# Patient Record
Sex: MALE | Race: WHITE | NOT HISPANIC OR LATINO | Employment: FULL TIME | URBAN - METROPOLITAN AREA
[De-identification: names, ages, dates, MRNs, and addresses within clinical notes are randomized per-mention and may not be internally consistent; named-entity substitution may affect disease eponyms.]

---

## 2017-05-08 ENCOUNTER — TELEPHONE (OUTPATIENT)
Dept: CARDIOLOGY | Facility: MEDICAL CENTER | Age: 47
End: 2017-05-08

## 2017-05-08 NOTE — TELEPHONE ENCOUNTER
----- Message from Mali Murray L.P.N. sent at 5/8/2017 10:54 AM PDT -----  Regarding: FW: doctor to doctor   Contact: [phone]  Printed and To  To  ----- Message -----     From: Nga Julian     Sent: 5/8/2017  10:43 AM       To: Mali Murray L.P.N.  Subject: doctor to doctor                                 KIMMY/mali Gutierrez with [facility] E/R for a doctor to doctor between Dr Sexton and our ADD, TT, asap.  Dr Sexton is at

## 2017-07-18 ENCOUNTER — HOSPITAL ENCOUNTER (OUTPATIENT)
Dept: RADIOLOGY | Facility: MEDICAL CENTER | Age: 47
End: 2017-07-18
Attending: ORTHOPAEDIC SURGERY
Payer: COMMERCIAL

## 2017-07-18 DIAGNOSIS — S82.251G: ICD-10-CM

## 2017-07-18 DIAGNOSIS — S82.451G: ICD-10-CM

## 2017-07-18 PROCEDURE — 73700 CT LOWER EXTREMITY W/O DYE: CPT | Mod: RT

## 2017-08-14 ENCOUNTER — APPOINTMENT (OUTPATIENT)
Dept: ADMISSIONS | Facility: MEDICAL CENTER | Age: 47
End: 2017-08-14
Attending: ORTHOPAEDIC SURGERY
Payer: COMMERCIAL

## 2017-08-14 RX ORDER — IBUPROFEN 200 MG
400 TABLET ORAL EVERY EVENING
COMMUNITY

## 2017-08-16 ENCOUNTER — HOSPITAL ENCOUNTER (OUTPATIENT)
Facility: MEDICAL CENTER | Age: 47
End: 2017-08-16
Attending: ORTHOPAEDIC SURGERY | Admitting: ORTHOPAEDIC SURGERY
Payer: COMMERCIAL

## 2017-08-16 ENCOUNTER — APPOINTMENT (OUTPATIENT)
Dept: RADIOLOGY | Facility: MEDICAL CENTER | Age: 47
End: 2017-08-16
Attending: ORTHOPAEDIC SURGERY
Payer: COMMERCIAL

## 2017-08-16 ENCOUNTER — HOSPITAL ENCOUNTER (OUTPATIENT)
Dept: RADIOLOGY | Facility: MEDICAL CENTER | Age: 47
End: 2017-08-16
Attending: ORTHOPAEDIC SURGERY | Admitting: ORTHOPAEDIC SURGERY
Payer: COMMERCIAL

## 2017-08-16 VITALS
RESPIRATION RATE: 12 BRPM | WEIGHT: 219.14 LBS | BODY MASS INDEX: 28.12 KG/M2 | OXYGEN SATURATION: 99 % | HEIGHT: 74 IN | HEART RATE: 77 BPM | TEMPERATURE: 97.6 F

## 2017-08-16 DIAGNOSIS — S82.251G: ICD-10-CM

## 2017-08-16 DIAGNOSIS — T84.84XA PAIN DUE TO HIP JOINT PROSTHESIS, INITIAL ENCOUNTER (HCC): ICD-10-CM

## 2017-08-16 DIAGNOSIS — Z96.649 PAIN DUE TO HIP JOINT PROSTHESIS, INITIAL ENCOUNTER (HCC): ICD-10-CM

## 2017-08-16 LAB
GRAM STN SPEC: NORMAL
SIGNIFICANT IND 70042: NORMAL
SITE SITE: NORMAL
SOURCE SOURCE: NORMAL

## 2017-08-16 PROCEDURE — 502000 HCHG MISC OR IMPLANTS RC 0278: Performed by: ORTHOPAEDIC SURGERY

## 2017-08-16 PROCEDURE — 160041 HCHG SURGERY MINUTES - EA ADDL 1 MIN LEVEL 4: Performed by: ORTHOPAEDIC SURGERY

## 2017-08-16 PROCEDURE — 73590 X-RAY EXAM OF LOWER LEG: CPT | Mod: RT

## 2017-08-16 PROCEDURE — 700102 HCHG RX REV CODE 250 W/ 637 OVERRIDE(OP)

## 2017-08-16 PROCEDURE — 500881 HCHG PACK, EXTREMITY: Performed by: ORTHOPAEDIC SURGERY

## 2017-08-16 PROCEDURE — 160029 HCHG SURGERY MINUTES - 1ST 30 MINS LEVEL 4: Performed by: ORTHOPAEDIC SURGERY

## 2017-08-16 PROCEDURE — 160035 HCHG PACU - 1ST 60 MINS PHASE I: Performed by: ORTHOPAEDIC SURGERY

## 2017-08-16 PROCEDURE — 160025 RECOVERY II MINUTES (STATS): Performed by: ORTHOPAEDIC SURGERY

## 2017-08-16 PROCEDURE — 160046 HCHG PACU - 1ST 60 MINS PHASE II: Performed by: ORTHOPAEDIC SURGERY

## 2017-08-16 PROCEDURE — 160009 HCHG ANES TIME/MIN: Performed by: ORTHOPAEDIC SURGERY

## 2017-08-16 PROCEDURE — A9270 NON-COVERED ITEM OR SERVICE: HCPCS

## 2017-08-16 PROCEDURE — 700111 HCHG RX REV CODE 636 W/ 250 OVERRIDE (IP)

## 2017-08-16 PROCEDURE — 110371 HCHG SHELL REV 272: Performed by: ORTHOPAEDIC SURGERY

## 2017-08-16 PROCEDURE — 700101 HCHG RX REV CODE 250: Mod: JW

## 2017-08-16 PROCEDURE — 87070 CULTURE OTHR SPECIMN AEROBIC: CPT

## 2017-08-16 PROCEDURE — 160048 HCHG OR STATISTICAL LEVEL 1-5: Performed by: ORTHOPAEDIC SURGERY

## 2017-08-16 PROCEDURE — 87075 CULTR BACTERIA EXCEPT BLOOD: CPT

## 2017-08-16 PROCEDURE — 87205 SMEAR GRAM STAIN: CPT

## 2017-08-16 PROCEDURE — 500891 HCHG PACK, ORTHO MAJOR: Performed by: ORTHOPAEDIC SURGERY

## 2017-08-16 PROCEDURE — 93970 EXTREMITY STUDY: CPT

## 2017-08-16 PROCEDURE — 160047 HCHG PACU  - EA ADDL 30 MINS PHASE II: Performed by: ORTHOPAEDIC SURGERY

## 2017-08-16 PROCEDURE — 501838 HCHG SUTURE GENERAL: Performed by: ORTHOPAEDIC SURGERY

## 2017-08-16 PROCEDURE — 160002 HCHG RECOVERY MINUTES (STAT): Performed by: ORTHOPAEDIC SURGERY

## 2017-08-16 PROCEDURE — 160036 HCHG PACU - EA ADDL 30 MINS PHASE I: Performed by: ORTHOPAEDIC SURGERY

## 2017-08-16 DEVICE — SCREW CROSS LOCK 5MM X 45MM (4TX5=20): Type: IMPLANTABLE DEVICE | Status: FUNCTIONAL

## 2017-08-16 DEVICE — SCREW CROSS LOCK 5MM X 42.5MM (4TX5=20): Type: IMPLANTABLE DEVICE | Status: FUNCTIONAL

## 2017-08-16 DEVICE — IMPLANTABLE DEVICE: Type: IMPLANTABLE DEVICE | Status: FUNCTIONAL

## 2017-08-16 RX ORDER — KETOROLAC TROMETHAMINE 30 MG/ML
INJECTION, SOLUTION INTRAMUSCULAR; INTRAVENOUS
Status: COMPLETED
Start: 2017-08-16 | End: 2017-08-16

## 2017-08-16 RX ORDER — OXYCODONE HCL 5 MG/5 ML
SOLUTION, ORAL ORAL
Status: COMPLETED
Start: 2017-08-16 | End: 2017-08-16

## 2017-08-16 RX ORDER — OXYCODONE HYDROCHLORIDE 5 MG/1
5 TABLET ORAL EVERY 4 HOURS PRN
Qty: 30 TAB | Refills: 0 | Status: SHIPPED | OUTPATIENT
Start: 2017-08-16

## 2017-08-16 RX ORDER — MAGNESIUM HYDROXIDE 1200 MG/15ML
LIQUID ORAL
Status: DISCONTINUED | OUTPATIENT
Start: 2017-08-16 | End: 2017-08-16 | Stop reason: HOSPADM

## 2017-08-16 RX ORDER — SODIUM CHLORIDE, SODIUM LACTATE, POTASSIUM CHLORIDE, CALCIUM CHLORIDE 600; 310; 30; 20 MG/100ML; MG/100ML; MG/100ML; MG/100ML
INJECTION, SOLUTION INTRAVENOUS CONTINUOUS
Status: DISCONTINUED | OUTPATIENT
Start: 2017-08-16 | End: 2017-08-16 | Stop reason: HOSPADM

## 2017-08-16 RX ORDER — KETOROLAC TROMETHAMINE 30 MG/ML
30 INJECTION, SOLUTION INTRAMUSCULAR; INTRAVENOUS EVERY 6 HOURS PRN
Status: DISCONTINUED | OUTPATIENT
Start: 2017-08-16 | End: 2017-08-16 | Stop reason: HOSPADM

## 2017-08-16 RX ADMIN — KETOROLAC TROMETHAMINE 30 MG: 30 INJECTION, SOLUTION INTRAMUSCULAR at 16:45

## 2017-08-16 RX ADMIN — HYDROMORPHONE HYDROCHLORIDE 0.2 MG: 1 INJECTION, SOLUTION INTRAMUSCULAR; INTRAVENOUS; SUBCUTANEOUS at 16:00

## 2017-08-16 RX ADMIN — FENTANYL CITRATE 25 MCG: 50 INJECTION, SOLUTION INTRAMUSCULAR; INTRAVENOUS at 14:48

## 2017-08-16 RX ADMIN — HYDROMORPHONE HYDROCHLORIDE 0.2 MG: 1 INJECTION, SOLUTION INTRAMUSCULAR; INTRAVENOUS; SUBCUTANEOUS at 16:05

## 2017-08-16 RX ADMIN — FENTANYL CITRATE 50 MCG: 50 INJECTION, SOLUTION INTRAMUSCULAR; INTRAVENOUS at 15:08

## 2017-08-16 RX ADMIN — HYDROMORPHONE HYDROCHLORIDE 0.2 MG: 1 INJECTION, SOLUTION INTRAMUSCULAR; INTRAVENOUS; SUBCUTANEOUS at 16:10

## 2017-08-16 RX ADMIN — FENTANYL CITRATE 25 MCG: 50 INJECTION, SOLUTION INTRAMUSCULAR; INTRAVENOUS at 15:47

## 2017-08-16 RX ADMIN — SODIUM CHLORIDE, SODIUM LACTATE, POTASSIUM CHLORIDE, CALCIUM CHLORIDE: 600; 310; 30; 20 INJECTION, SOLUTION INTRAVENOUS at 12:25

## 2017-08-16 RX ADMIN — OXYCODONE HYDROCHLORIDE 10 MG: 5 SOLUTION ORAL at 15:15

## 2017-08-16 RX ADMIN — KETOROLAC TROMETHAMINE 30 MG: 30 INJECTION, SOLUTION INTRAMUSCULAR; INTRAVENOUS at 16:45

## 2017-08-16 RX ADMIN — HYDROMORPHONE HYDROCHLORIDE 0.2 MG: 1 INJECTION, SOLUTION INTRAMUSCULAR; INTRAVENOUS; SUBCUTANEOUS at 15:54

## 2017-08-16 ASSESSMENT — PAIN SCALES - GENERAL
PAINLEVEL_OUTOF10: 7
PAINLEVEL_OUTOF10: 5
PAINLEVEL_OUTOF10: 4
PAINLEVEL_OUTOF10: 7
PAINLEVEL_OUTOF10: 5
PAINLEVEL_OUTOF10: 7
PAINLEVEL_OUTOF10: 5

## 2017-08-16 NOTE — OP REPORT
DATE OF SERVICE:  08/16/2017    PREOPERATIVE DIAGNOSIS:  Right tibial nonunion.    POSTOPERATIVE DIAGNOSIS:  Right tibial nonunion.    PROCEDURE PERFORMED:  Exchange nailing, right tibia.    NAME OF SURGEON:  Jaime Renee MD    ASSISTANT:  Pratik Navarro PA-C    ESTIMATED BLOOD LOSS:  Minimal.    INDICATIONS:  This is a 47-year-old male who had a tibial nail placed by an   outside physician, which has gone on to nonunion.  Risks and benefits of nail   removal and exchange nailing were discussed at length, which include but not   limited to bleeding, infection, neurovascular damage, pain, stiffness,   malunion, nonunion, need for further surgery.  He understands all these risks   and wished to proceed.    DESCRIPTION OF PROCEDURE:  The patient was sedated with LMA anesthesia and   administered preoperative antibiotics.  Right lower extremity was prepped in   usual sterile fashion.  His 2 distal cross lock screws and 1 proximal cross   lock screw were removed and the nail was removed through its prior incision.    The nail removed was a 9 mm nail and the canal was reamed to 13 mm.  Deep   cultures were sent from reamings and then an OIC 360x11.5 mm nail was inserted   to the appropriate depth.  One distal cross lock screw and 1 proximal cross   lock screw were placed.  Wounds were irrigated, closed with 2-0 Vicryl suture   and staples.  Sterile dressings were applied.  The patient tolerated the   procedure well.    POSTOPERATIVE PLAN:  The patient will be discharged home, weightbearing as   tolerated, and we will follow his cultures for possible occult infection.       ____________________________________     JAIME RENEE MD    JULIETTE / NTS    DD:  08/16/2017 14:31:00  DT:  08/16/2017 15:34:55    D#:  [id number]  Job#:  [id number]

## 2017-08-16 NOTE — H&P
8/16/2017    Momo Browne is a 47 y.o. male who presents after a IMN tibia by outside physician with a right tibial nonunion and is here for operative management. Patient denies numbness, parasthesias, loss of concousness or other trauma    Past Medical History   Diagnosis Date   • Deviated septum 1990's   • Hemorrhagic disorder (CMS-[facility])      xarelto   • Blood clotting disorder (CMS-[facility]) 5/2017     PE   • Pain 8/2017     right leg 8/10   • Staph infection 2017     right leg   • Abnormal echocardiogram 5/2017     results in Epic   • Breath shortness 5/2017     with PE       Past Surgical History   Procedure Laterality Date   • Other orthopedic surgery  2/19/2017     right tibia nailing   • Other orthopedic surgery  2006     left hand   • Nasal septal reconstruction         Medications  No current facility-administered medications on file prior to encounter.     Current Outpatient Prescriptions on File Prior to Encounter   Medication Sig Dispense Refill   • mirtazapine (REMERON) 7.5 MG tablet Take 7.5 mg by mouth every evening.     • rivaroxaban (XARELTO) 20 MG Tab tablet Take 1 Tab by mouth with dinner. 30 Tab 0       Allergies  Review of patient's allergies indicates no known allergies.    ROS  Right leg pain. All other systems were reviewed and found to be negative    History reviewed. No pertinent family history.    Social History     Social History   • Marital Status: Single     Spouse Name: N/A   • Number of Children: N/A   • Years of Education: N/A     Social History Main Topics   • Smoking status: Former Smoker -- 1.00 packs/day for 22 years     Types: Cigarettes     Quit date: 01/01/2006   • Smokeless tobacco: Former User     Types: Chew   • Alcohol Use: No      Comment: none since 1/13/2008   • Drug Use: No      Comment: no reacreatiol drugs since 1/2008   • Sexual Activity: Not Asked     Other Topics Concern   • None     Social History Narrative       Physical Exam  Vitals  Pulse 80, temperature 36.6  "°C (97.8 °F), resp. rate 17, height 1.88 m (6' 2\"), weight 99.4 kg (219 lb 2.2 oz), SpO2 95 %.  General: Well Developed, Well Nourished, no acute distress  HEENT: Normocephalic, atraumatic  Eyes: Anicteric, PERRLA, EOMI  Neck: Supple, nontender, no masses  Lungs: CTA, no wheezes or crackles  Heart: RRR, no murmurs, rubs or gallops  Abdomen: Soft, NT, ND  Pelvis: Stable to AP and Lateral Compression  Skin: Intact, no open wounds  Extremities: Right leg pain, slight deformity  Neuro: NVI  Vascular: 2+DP/PT, Capillary refill <2 seconds    Radiographs:  Right tibial nonunion  DX-PORTABLE FLUOROSCOPY < 1 HOUR    (Results Pending)   DX-TIBIA AND FIBULA RIGHT    (Results Pending)       Laboratory Values      No results for input(s): SODIUM, POTASSIUM, CHLORIDE, CO2, GLUCOSE, BUN, CPKTOTAL in the last 72 hours.          Impression:Right tibia nonunion    Plan:Operative intervention. Risks and benefits of surgery were discussed which include but are not limited to bleeding, infection, neurovascular damage, malunion, nonunion, instability, limb length discrepancy, DVT, PE, MI, Stroke and death. They understand these risks and wish to proceed.      "

## 2017-08-16 NOTE — IP AVS SNAPSHOT
8/16/2017    Momo Browne  [address]  [facility] [address]    Dear Momo:    [facility] wants to ensure your discharge home is safe and you or your loved ones have had all of your questions answered regarding your care after you leave the hospital.    Below is a list of resources and contact information should you have any questions regarding your hospital stay, follow-up instructions, or active medical symptoms.    Questions or Concerns Regarding… Contact   Medical Questions Related to Your Discharge  (7 days a week, 8am-5pm) Contact a Nurse Care Coordinator   [phone]   Medical Questions Not Related to Your Discharge  (24 hours a day / 7 days a week)  Contact the Nurse Health Line   [phone]    Medications or Discharge Instructions Refer to your discharge packet   or contact your [facility] Primary Care Provider   [phone]   Follow-up Appointment(s) Schedule your appointment via youwho   or contact Scheduling [phone]   Billing Review your statement via youwho  or contact Billing [phone]   Medical Records Review your records via youwho   or contact Medical Records [phone]     You may receive a telephone call within two days of discharge. This call is to make certain you understand your discharge instructions and have the opportunity to have any questions answered. You can also easily access your medical information, test results and upcoming appointments via the youwho free online health management tool. You can learn more and sign up at EGT/youwho. For assistance setting up your youwho account, please call [phone].    Once again, we want to ensure your discharge home is safe and that you have a clear understanding of any next steps in your care. If you have any questions or concerns, please do not hesitate to contact us, we are here for you. Thank you for choosing [facility] for your healthcare needs.    Sincerely,    Your [facility] Healthcare Team

## 2017-08-16 NOTE — OR NURSING
Pt with bloody drainage to right lower leg, Dr. Hernandez informed , stated to be expected and to reinforce.

## 2017-08-16 NOTE — IP AVS SNAPSHOT
" Home Care Instructions                                                                                                                Name:Momo Browne  Medical Record Number:[id number]  CSN: [id number]    YOB: 1970   Age: 47 y.o.  Sex: male  HT:1.88 m (6' 2\") WT: 99.4 kg (219 lb 2.2 oz)          Admit Date: 8/16/2017     Discharge Date:   Today's Date: 8/16/2017  Attending Doctor:  Jaime Hernandez M.D.                  Allergies:  Review of patient's allergies indicates no known allergies.              Follow-up Information     1. Follow up with Jaime Hernandez M.D. In 2 weeks.    Specialty:  Orthopaedics    Contact information    [address]  [address]  [facility] [address]  [phone]          Discharge Instructions         ACTIVITY: Rest and take it easy for the first 24 hours.  A responsible adult is recommended to remain with you during that time.  It is normal to feel sleepy.  We encourage you to not do anything that requires balance, judgment or coordination.    MILD FLU-LIKE SYMPTOMS ARE NORMAL. YOU MAY EXPERIENCE GENERALIZED MUSCLE ACHES, THROAT IRRITATION, HEADACHE AND/OR SOME NAUSEA.    FOR 24 HOURS DO NOT:  Drive, operate machinery or run household appliances.  Drink beer or alcoholic beverages.   Make important decisions or sign legal documents.    SPECIAL INSTRUCTIONS: **  DISCHARGE INSTRUCTIONS:    ACTIVITY:  The patient should remain full weightbearing with the use of gait aids (crutches, walker, cane, etc).    PAIN CONTROL:  The patient has been prescribed a narcotic pain medication.  Side effects of narcotics pain medications include sedation and constipation.  To prevent constipation, it is recommended that the patient take an over the counter stool softener (such as Colace or Senna) and use laxatives as needed to prevent constipation.  Take these over the counter medications as directed by the packaging.  The patient may not drive while taking narcotic pain medication.  The " patient should wean off their prescription pain medication by taking over the counter analgesics (such as Tylenol, Advil, Ibuprofen, etc).  Use caution when taking acetaminophen (Tylenol), as some narcotic medications contain acetaminophen.  The total dose of acetaminophen should not exceed 3000 mg daily.    WOUND CARE:  The patient has a surgical wound which was closed with staples or sutures.  These need to be removed 10-14 days after surgery.  If the patient is not in a splint or cast, the operative dressing should be removed 2 days after surgery, and dry gauze dressing changes should be performed daily after that.  You may shower when the operative dressing is removed.    SPLINT/CAST CARE:  If present, you should keep your splint or cast clean, dry, and intact.  You should elevate your operative extremity to minimize swelling in your fingers or toes.  If the sensation in your fingers or toes of your operative extremity changes, or the fingers/toes change colors, or should your pain become too difficult to control, you should immediately elevate your operative extremity.  If these symptoms do not resolve after 30 minutes to 1 hour, you should seek medical care immediately.    CALL YOUR DOCTOR IF:  You have fever over 101.5 degrees F, you have increased or concerning wound drainage, you notice a great deal of redness around your incision, your pain can no longer be controlled, the sensation in your fingers or toes changes and does not respond to elevation, your cast or splint becomes saturated (wet) or other concerns.  If you suffer a medical emergency, seek immediate medical care at your nearest Emergency Room.*    DIET: To avoid nausea, slowly advance diet as tolerated, avoiding spicy or greasy foods for the first day.  Add more substantial food to your diet according to your physician's instructions.  Babies can be fed formula or breast milk as soon as they are hungry.  INCREASE FLUIDS AND FIBER TO AVOID  CONSTIPATION.    SURGICAL DRESSING/BATHING: *no tub baths or soaking of incision area until surgeon gives approval**    FOLLOW-UP APPOINTMENT:  A follow-up appointment should be arranged with your doctor, Call to schedule.    You should CALL YOUR PHYSICIAN if you develop:  Fever greater than 101 degrees F.  Pain not relieved by medication, or persistent nausea or vomiting.  Excessive bleeding (blood soaking through dressing) or unexpected drainage from the wound.  Extreme redness or swelling around the incision site, drainage of pus or foul smelling drainage.  Inability to urinate or empty your bladder within 8 hours.  Problems with breathing or chest pain.    You should call 911 if you develop problems with breathing or chest pain.  If you are unable to contact your doctor or surgical center, you should go to the nearest emergency room or urgent care center.  Physician's telephone #: *Dr. Hernandez [phone]**    If any questions arise, call your doctor.  If your doctor is not available, please feel free to call the Surgical Center at [phone].  The Center is open Monday through Friday from 7AM to 7PM.  You can also call the [facility] HOTLINE open 24 hours/day, 7 days/week and speak to a nurse at [phone], or toll free at [phone].    A registered nurse may call you a few days after your surgery to see how you are doing after your procedure.    MEDICATIONS: Resume taking daily medication.  Take prescribed pain medication with food.  If no medication is prescribed, you may take non-aspirin pain medication if needed.  PAIN MEDICATION CAN BE VERY CONSTIPATING.  Take a stool softener or laxative such as senokot, pericolace, or milk of magnesia if needed.    Last pain medication given at *3:15 p.m.**.    If your physician has prescribed pain medication that includes Acetaminophen (Tylenol), do not take additional Acetaminophen (Tylenol) while taking the prescribed medication.    Depression / Suicide  Risk    As you are discharged from this [facility] facility, it is important to learn how to keep safe from harming yourself.    Recognize the warning signs:  · Abrupt changes in personality, positive or negative- including increase in energy   · Giving away possessions  · Change in eating patterns- significant weight changes-  positive or negative  · Change in sleeping patterns- unable to sleep or sleeping all the time   · Unwillingness or inability to communicate  · Depression  · Unusual sadness, discouragement and loneliness  · Talk of wanting to die  · Neglect of personal appearance   · Rebelliousness- reckless behavior  · Withdrawal from people/activities they love  · Confusion- inability to concentrate     If you or a loved one observes any of these behaviors or has concerns about self-harm, here's what you can do:  · Talk about it- your feelings and reasons for harming yourself  · Remove any means that you might use to hurt yourself (examples: pills, rope, extension cords, firearm)  · Get professional help from the community (Mental Health, Substance Abuse, psychological counseling)  · Do not be alone:Call your Safe Contact- someone whom you trust who will be there for you.  · Call your local CRISIS HOTLINE 845-5073 or 206-755-5180  · Call your local Children's Mobile Crisis Response Team [place] (762) 700-9246 or www.[facility]  · Call the toll free National Suicide Prevention Hotlines   · National Suicide Prevention Lifeline 641-925-EPXZ (4331)  · National Hope Line Network 800-SUICIDE (153-1112)       Medication List      START taking these medications        Instructions    Morning Afternoon Evening Bedtime    * oxycodone immediate-release 5 MG Tabs   Commonly known as:  ROXICODONE        Take 1 Tab by mouth every four hours as needed for Severe Pain.   Dose:  5 mg                        * oxycodone immediate-release 5 MG Tabs   Commonly known as:  ROXICODONE        Take 1 Tab by mouth  every four hours as needed for Severe Pain.   Dose:  5 mg                        * Notice:  This list has 2 medication(s) that are the same as other medications prescribed for you. Read the directions carefully, and ask your doctor or other care provider to review them with you.      CONTINUE taking these medications        Instructions    Morning Afternoon Evening Bedtime    ibuprofen 200 MG Tabs   Commonly known as:  MOTRIN        Take 400 mg by mouth every evening.   Dose:  400 mg                        mirtazapine 7.5 MG tablet   Commonly known as:  REMERON        Take 7.5 mg by mouth every evening.   Dose:  7.5 mg                        rivaroxaban 20 MG Tabs tablet   Commonly known as:  XARELTO        Take 1 Tab by mouth with dinner.   Dose:  20 mg                             Where to Get Your Medications      You can get these medications from any pharmacy     Bring a paper prescription for each of these medications    - oxycodone immediate-release 5 MG Tabs  - oxycodone immediate-release 5 MG Tabs            Medication Information     Above and/or attached are the medications your physician expects you to take upon discharge. Review all of your home medications and newly ordered medications with your doctor and/or pharmacist. Follow medication instructions as directed by your doctor and/or pharmacist. Please keep your medication list with you and share with your physician. Update the information when medications are discontinued, doses are changed, or new medications (including over-the-counter products) are added; and carry medication information at all times in the event of emergency situations.        Resources     Quit Smoking / Tobacco Use:    I understand the use of any tobacco products increases my chance of suffering from future heart disease or stroke and could cause other illnesses which may shorten my life. Quitting the use of tobacco products is the single most important thing I can do to improve  my health. For further information on smoking / tobacco cessation call a Toll Free Quit Line at 1-877.458.7068 (*National Cancer [place]) or 1-920.189.8335 (American Lung Association) or you can access the web based program at www.lungusa.org.    [place] Tobacco Users Help Line:  (326) 640-2041       Toll Free: 1-638.261.9683  Quit Tobacco Program [facility] Services (015)656-5975    Crisis Hotline:    [place] Crisis Hotline:  8-665-BSLWVLJ or 1-192.291.8652    [place] Crisis Hotline:    1-894.114.4786 or 817-796-2124    Discharge Survey:   Thank you for choosing [facility]. We hope we did everything we could to make your hospital stay a pleasant one. You may be receiving a survey and we would appreciate your time and participation in answering the questions. Your input is very valuable to us in our efforts to improve our service to our patients and their families.            Signatures     My signature on this form indicates that:    1. I acknowledge receipt and understanding of these Home Care Instruction.  2. My questions regarding this information have been answered to my satisfaction.  3. I have formulated a plan with my discharge nurse to obtain my prescribed medications for home.    __________________________________      __________________________________                   Patient Signature                                 Guardian/Responsible Adult Signature      __________________________________                 __________       ________                       Nurse Signature                                               Date                 Time

## 2017-08-17 NOTE — DISCHARGE INSTRUCTIONS
ACTIVITY: Rest and take it easy for the first 24 hours.  A responsible adult is recommended to remain with you during that time.  It is normal to feel sleepy.  We encourage you to not do anything that requires balance, judgment or coordination.    MILD FLU-LIKE SYMPTOMS ARE NORMAL. YOU MAY EXPERIENCE GENERALIZED MUSCLE ACHES, THROAT IRRITATION, HEADACHE AND/OR SOME NAUSEA.    FOR 24 HOURS DO NOT:  Drive, operate machinery or run household appliances.  Drink beer or alcoholic beverages.   Make important decisions or sign legal documents.    SPECIAL INSTRUCTIONS: **  DISCHARGE INSTRUCTIONS:    ACTIVITY:  The patient should remain full weightbearing with the use of gait aids (crutches, walker, cane, etc).    PAIN CONTROL:  The patient has been prescribed a narcotic pain medication.  Side effects of narcotics pain medications include sedation and constipation.  To prevent constipation, it is recommended that the patient take an over the counter stool softener (such as Colace or Senna) and use laxatives as needed to prevent constipation.  Take these over the counter medications as directed by the packaging.  The patient may not drive while taking narcotic pain medication.  The patient should wean off their prescription pain medication by taking over the counter analgesics (such as Tylenol, Advil, Ibuprofen, etc).  Use caution when taking acetaminophen (Tylenol), as some narcotic medications contain acetaminophen.  The total dose of acetaminophen should not exceed 3000 mg daily.    WOUND CARE:  The patient has a surgical wound which was closed with staples or sutures.  These need to be removed 10-14 days after surgery.  If the patient is not in a splint or cast, the operative dressing should be removed 2 days after surgery, and dry gauze dressing changes should be performed daily after that.  You may shower when the operative dressing is removed.    SPLINT/CAST CARE:  If present, you should keep your splint or cast  clean, dry, and intact.  You should elevate your operative extremity to minimize swelling in your fingers or toes.  If the sensation in your fingers or toes of your operative extremity changes, or the fingers/toes change colors, or should your pain become too difficult to control, you should immediately elevate your operative extremity.  If these symptoms do not resolve after 30 minutes to 1 hour, you should seek medical care immediately.    CALL YOUR DOCTOR IF:  You have fever over 101.5 degrees F, you have increased or concerning wound drainage, you notice a great deal of redness around your incision, your pain can no longer be controlled, the sensation in your fingers or toes changes and does not respond to elevation, your cast or splint becomes saturated (wet) or other concerns.  If you suffer a medical emergency, seek immediate medical care at your nearest Emergency Room.*    DIET: To avoid nausea, slowly advance diet as tolerated, avoiding spicy or greasy foods for the first day.  Add more substantial food to your diet according to your physician's instructions.  Babies can be fed formula or breast milk as soon as they are hungry.  INCREASE FLUIDS AND FIBER TO AVOID CONSTIPATION.    SURGICAL DRESSING/BATHING: *no tub baths or soaking of incision area until surgeon gives approval**    FOLLOW-UP APPOINTMENT:  A follow-up appointment should be arranged with your doctor, Call to schedule.    You should CALL YOUR PHYSICIAN if you develop:  Fever greater than 101 degrees F.  Pain not relieved by medication, or persistent nausea or vomiting.  Excessive bleeding (blood soaking through dressing) or unexpected drainage from the wound.  Extreme redness or swelling around the incision site, drainage of pus or foul smelling drainage.  Inability to urinate or empty your bladder within 8 hours.  Problems with breathing or chest pain.    You should call 911 if you develop problems with breathing or chest pain.  If you are  unable to contact your doctor or surgical center, you should go to the nearest emergency room or urgent care center.  Physician's telephone #: *Dr. Hernandez [phone]**    If any questions arise, call your doctor.  If your doctor is not available, please feel free to call the Surgical Center at [phone].  The Center is open Monday through Friday from 7AM to 7PM.  You can also call the HEALTH HOTLINE open 24 hours/day, 7 days/week and speak to a nurse at [phone], or toll free at [phone].    A registered nurse may call you a few days after your surgery to see how you are doing after your procedure.    MEDICATIONS: Resume taking daily medication.  Take prescribed pain medication with food.  If no medication is prescribed, you may take non-aspirin pain medication if needed.  PAIN MEDICATION CAN BE VERY CONSTIPATING.  Take a stool softener or laxative such as senokot, pericolace, or milk of magnesia if needed.    Last pain medication given at *3:15 p.m.**.    If your physician has prescribed pain medication that includes Acetaminophen (Tylenol), do not take additional Acetaminophen (Tylenol) while taking the prescribed medication.    Depression / Suicide Risk    As you are discharged from this [facility] facility, it is important to learn how to keep safe from harming yourself.    Recognize the warning signs:  · Abrupt changes in personality, positive or negative- including increase in energy   · Giving away possessions  · Change in eating patterns- significant weight changes-  positive or negative  · Change in sleeping patterns- unable to sleep or sleeping all the time   · Unwillingness or inability to communicate  · Depression  · Unusual sadness, discouragement and loneliness  · Talk of wanting to die  · Neglect of personal appearance   · Rebelliousness- reckless behavior  · Withdrawal from people/activities they love  · Confusion- inability to concentrate     If you or a loved one observes  any of these behaviors or has concerns about self-harm, here's what you can do:  · Talk about it- your feelings and reasons for harming yourself  · Remove any means that you might use to hurt yourself (examples: pills, rope, extension cords, firearm)  · Get professional help from the community (Mental Health, Substance Abuse, psychological counseling)  · Do not be alone:Call your Safe Contact- someone whom you trust who will be there for you.  · Call your local CRISIS HOTLINE 246-3187 or 126-482-6452  · Call your local Children's Mobile Crisis Response Team [place] (999) 099-0503 or www.Genlot  · Call the toll free National Suicide Prevention Hotlines   · National Suicide Prevention Lifeline 686-485-IBQI (0631)  · National Hope Line Network 800-SUICIDE (897-7315)

## 2017-08-19 LAB
BACTERIA WND AEROBE CULT: NORMAL
GRAM STN SPEC: NORMAL
SIGNIFICANT IND 70042: NORMAL
SITE SITE: NORMAL
SOURCE SOURCE: NORMAL

## 2017-08-21 LAB
BACTERIA SPEC ANAEROBE CULT: NORMAL
SIGNIFICANT IND 70042: NORMAL
SITE SITE: NORMAL
SOURCE SOURCE: NORMAL

## (undated) DEVICE — SET LEADWIRE 5 LEAD BEDSIDE DISPOSABLE ECG (1SET OF 5/EA)

## (undated) DEVICE — KIT ROOM DECONTAMINATION

## (undated) DEVICE — GLOVE BIOGEL INDICATOR SZ 8 SURGICAL PF LTX - (50/BX 4BX/CA)

## (undated) DEVICE — DRAPE STRLE REG TOWEL 18X24 - (10/BX 4BX/CA)"

## (undated) DEVICE — PROTECTOR ULNA NERVE - (36PR/CA)

## (undated) DEVICE — GLOVE BIOGEL SZ 7.5 SURGICAL PF LTX - (50PR/BX 4BX/CA)

## (undated) DEVICE — SODIUM CHL IRRIGATION 0.9% 1000ML (12EA/CA)

## (undated) DEVICE — MASK ANESTHESIA ADULT  - (100/CA)

## (undated) DEVICE — NEPTUNE 4 PORT MANIFOLD - (20/PK)

## (undated) DEVICE — ELECTRODE 850 FOAM ADHESIVE - HYDROGEL RADIOTRNSPRNT (50/PK)

## (undated) DEVICE — SENSOR SPO2 NEO LNCS ADHESIVE (20/BX) SEE USER NOTES

## (undated) DEVICE — LACTATED RINGERS INJ 1000 ML - (14EA/CA 60CA/PF)

## (undated) DEVICE — SUTURE 1 VICRYL PLUS CTX - 36 INCH (36/BX)

## (undated) DEVICE — DRAPE C-ARM LARGE 41IN X 74 IN - (10/BX 2BX/CA)

## (undated) DEVICE — SUTURE 3-0 ETHILON FS-1 - (36/BX) 30 INCH

## (undated) DEVICE — WATER IRRIG. STER. 1500 ML - (9/CA)

## (undated) DEVICE — PACK MAJOR ORTHO - (2EA/CA)

## (undated) DEVICE — SUCTION INSTRUMENT YANKAUER BULBOUS TIP W/O VENT (50EA/CA)

## (undated) DEVICE — PACK LOWER EXTREMITY - (2/CA)

## (undated) DEVICE — DRAPE LOWER EXTREMETY - (6/CA)

## (undated) DEVICE — ROD GUIDE BALL TIP 3.0MM X 1000MM

## (undated) DEVICE — HEAD HOLDER JUNIOR/ADULT

## (undated) DEVICE — SUTURE 2-0 MONOCRYL CT-1

## (undated) DEVICE — PAD LAP STERILE 18 X 18 - (5/PK 40PK/CA)

## (undated) DEVICE — CANISTER SUCTION 3000ML MECHANICAL FILTER AUTO SHUTOFF MEDI-VAC NONSTERILE LF DISP  (40EA/CA)

## (undated) DEVICE — GUIDE ROD R-T BALL TIP 3.0

## (undated) DEVICE — BIT DRILL SHORT 4.2MM X 155MM (4TX2=8)

## (undated) DEVICE — SLEEVE, VASO, THIGH, MED

## (undated) DEVICE — DRAPE LARGE 3 QUARTER - (20/CA)

## (undated) DEVICE — MASK, LARYNGEAL AIRWAY #5

## (undated) DEVICE — GOWN WARMING STANDARD FLEX - (30/CA)

## (undated) DEVICE — CHLORAPREP 26 ML APPLICATOR - ORANGE TINT(25/CA)

## (undated) DEVICE — BIT DRILL LONG CALIBRATED 4.2MM X 330MM (4TX2=8)

## (undated) DEVICE — SET EXTENSION WITH 2 PORTS (48EA/CA) ***PART #2C8610 IS A SUBSTITUTE*****

## (undated) DEVICE — PADDING CAST 6 IN STERILE - 6 X 4 YDS (24/CA)

## (undated) DEVICE — KIT ANESTHESIA W/CIRCUIT & 3/LT BAG W/FILTER (20EA/CA)

## (undated) DEVICE — TUBING CLEARLINK DUO-VENT - C-FLO (48EA/CA)

## (undated) DEVICE — SUTURE 2-0 VICRYL PLUS CT-1 - 8 X 18 INCH(12/BX)

## (undated) DEVICE — ELECTRODE DUAL RETURN W/ CORD - (50/PK)

## (undated) DEVICE — SUTURE GENERAL

## (undated) DEVICE — SUTURE 0 VICRYL PLUS CT-1 - 8 X 18 INCH (12/BX)